# Patient Record
Sex: MALE
[De-identification: names, ages, dates, MRNs, and addresses within clinical notes are randomized per-mention and may not be internally consistent; named-entity substitution may affect disease eponyms.]

---

## 2019-01-01 ENCOUNTER — HOSPITAL (OUTPATIENT)
Dept: OTHER | Age: 39
End: 2019-01-01
Attending: EMERGENCY MEDICINE

## 2024-02-11 ENCOUNTER — HOSPITAL ENCOUNTER (EMERGENCY)
Facility: HOSPITAL | Age: 44
Discharge: HOME | End: 2024-02-11
Attending: EMERGENCY MEDICINE

## 2024-02-11 VITALS
SYSTOLIC BLOOD PRESSURE: 138 MMHG | TEMPERATURE: 98 F | HEIGHT: 70 IN | BODY MASS INDEX: 34.36 KG/M2 | DIASTOLIC BLOOD PRESSURE: 88 MMHG | OXYGEN SATURATION: 97 % | RESPIRATION RATE: 16 BRPM | WEIGHT: 240 LBS | HEART RATE: 70 BPM

## 2024-02-11 DIAGNOSIS — K08.89 PAIN, DENTAL: Primary | ICD-10-CM

## 2024-02-11 PROCEDURE — 99284 EMERGENCY DEPT VISIT MOD MDM: CPT

## 2024-02-11 PROCEDURE — 99283 EMERGENCY DEPT VISIT LOW MDM: CPT

## 2024-02-11 RX ORDER — HYDROCODONE BITARTRATE AND ACETAMINOPHEN 5; 325 MG/1; MG/1
1 TABLET ORAL EVERY 8 HOURS PRN
Qty: 10 TABLET | Refills: 0 | Status: SHIPPED | OUTPATIENT
Start: 2024-02-11 | End: 2024-02-16

## 2024-02-11 RX ORDER — PENICILLIN V POTASSIUM 500 MG/1
500 TABLET ORAL 4 TIMES DAILY
Qty: 40 TABLET | Refills: 0 | Status: SHIPPED | OUTPATIENT
Start: 2024-02-11 | End: 2024-02-21

## 2024-02-11 RX ORDER — PENICILLIN V POTASSIUM 500 MG/1
500 TABLET ORAL ONCE
Status: COMPLETED | OUTPATIENT
Start: 2024-02-11 | End: 2024-02-11

## 2024-02-11 RX ORDER — HYDROCODONE BITARTRATE AND ACETAMINOPHEN 5; 325 MG/1; MG/1
2 TABLET ORAL ONCE
Status: COMPLETED | OUTPATIENT
Start: 2024-02-11 | End: 2024-02-11

## 2024-02-11 RX ORDER — PENICILLIN V POTASSIUM 500 MG/1
500 TABLET ORAL 4 TIMES DAILY
Qty: 28 TABLET | Refills: 0 | Status: SHIPPED | OUTPATIENT
Start: 2024-02-11 | End: 2024-02-11

## 2024-02-11 RX ORDER — HYDROCODONE BITARTRATE AND ACETAMINOPHEN 5; 325 MG/1; MG/1
1 TABLET ORAL EVERY 8 HOURS PRN
Qty: 10 TABLET | Refills: 0 | Status: SHIPPED | OUTPATIENT
Start: 2024-02-11 | End: 2024-02-11

## 2024-02-12 NOTE — ED INITIAL ASSESSMENT (HPI)
Pt broke a molar left upper on Friday by accidentally biting on a bone in a steak. Pt reports throbbing pain.

## 2024-02-12 NOTE — ED PROVIDER NOTES
Patient Seen in: [facility] Emergency Department      History     Chief Complaint   Patient presents with    Dental Problem     Stated Complaint: broken tooth    Subjective:   43-year-old male, denies chronic past medical history, presents with a broken tooth.  States 2 days ago he was eating steak and bit down on the bone and correct his tooth #14.  States it was already decayed and fractured with this completely broke it off.  Has appoint with a dentist tomorrow or Tuesday awaiting hear back in the morning.  No facial swelling or cellulitis.  He is tobacco smoker.  No sore throat.  No difficulty swallowing.  No voice changes.  No fevers.  Not immunocompromise.            Objective:   History reviewed. No pertinent past medical history.           History reviewed. No pertinent surgical history.             Social History     Socioeconomic History    Marital status: Single   Tobacco Use    Smoking status: Some Days     Types: Cigarettes    Smokeless tobacco: Never   Vaping Use    Vaping Use: Never used   Substance and Sexual Activity    Alcohol use: Not Currently    Drug use: Never              Review of Systems   Constitutional:  Negative for fever.   HENT:  Positive for dental problem. Negative for sinus pain, trouble swallowing and voice change.    Respiratory:  Negative for shortness of breath.    Cardiovascular:  Negative for chest pain.   Neurological:  Negative for headaches.       Positive for stated complaint: broken tooth  Other systems are as noted in HPI.  Constitutional and vital signs reviewed.      All other systems reviewed and negative except as noted above.    Physical Exam     ED Triage Vitals [02/11/24 1820]   /88   Pulse 73   Resp 18   Temp 97.6 °F (36.4 °C)   Temp src Temporal   SpO2 97 %   O2 Device None (Room air)       Current:/88   Pulse 73   Temp 97.6 °F (36.4 °C) (Temporal)   Resp 18   Ht 177.8 cm (5' 10\")   Wt 108.9 kg   SpO2 97%   BMI 34.44 kg/m²          Physical Exam  Vitals and nursing note reviewed.   Constitutional:       Appearance: He is not toxic-appearing.   HENT:      Head: Normocephalic.      Nose: Nose normal.      Mouth/Throat:      Pharynx: Oropharynx is clear. No oropharyngeal exudate or posterior oropharyngeal erythema.      Comments: Tooth #14 pain was completely cracked off and missing.  There are some decay of what appears to be the root there.  There is no signs of dental abscess or gum changes.  Posterior oropharynx is clear.  No facial swelling or cellulitis.  Cardiovascular:      Heart sounds: Normal heart sounds.   Pulmonary:      Breath sounds: Normal breath sounds.   Abdominal:      Palpations: Abdomen is soft.   Musculoskeletal:         General: Normal range of motion.   Skin:     General: Skin is warm and dry.   Neurological:      General: No focal deficit present.      Mental Status: He is alert.      Cranial Nerves: No cranial nerve deficit.   Psychiatric:         Behavior: Behavior normal.               ED Course   Labs Reviewed - No data to display                   MDM      Differential diagnosis includes, was not limited to, dental abscess, dental infection, fractured tooth, cellulitis    There are no external records in care everywhere or in Psychiatric that I can view to compare to    43-year-old male presents with dental pain as above.  Seeing dentist either tomorrow or Tuesday.  Educated on Tylenol and Motrin administration.  Will give a short course of Norco for pain.  Also start antibiotics, penicillin VK.  He does not have any antibiotic allergies.  He has a ride home.  He is well-appearing and nontoxic otherwise, counseled smoking cessation, return precaution provided, shared decision made utilized    Patient was screened and evaluated during this visit.  As the treating physician attending to the patient, I determined within reasonable clinical confidence and prior to discharge, that an emergency medical condition was not  or was no longer present.  There was no indication for further evaluation, treatment, or admission on an emergency basis.  Comprehensive verbal and written discharge and follow-up instructions were provided to help prevent relapse or worsening.  Patient was instructed to follow-up with their primary care provider for further evaluation and treatment, return immediately to ER for worsening, concerning, new, or changing/persisting symptoms. I discussed the case with the patient and they had no questions, complaints, or concerns.  Patient was comfortable going home.     Per the discharge paperwork, patients are encouraged to and given instructions on how to sign [facility], where they have access to their records, including any/all incidental findings.     This note was prepared using Dragon Medical voice recognition dictation software. As a result errors may occur. When identified these errors have been corrected. While every attempt is made to correct errors during dictation discrepancies may still exist    Note to patient: The 21st Century Cures Act makes medical notes like these available to patients in the interest of transparency. However, this is a medical document intended as peer to peer communication. It is written in medical language and may contain abbreviations or verbiage that are unfamiliar. It may appear blunt or direct. Medical documents are intended to carry relevant information, facts as evident, and the clinical opinion of the practitioner.                                          Medical Decision Making      Disposition and Plan     Clinical Impression:  1. Pain, dental         Disposition:  Discharge  2/11/2024  6:42 pm    Follow-up:  Conchita King DO  [id number] [address] [id number] [phone]    Follow up  As needed          Medications Prescribed:  Current Discharge Medication List        START taking these medications    Details   penicillin v potassium 500 MG Oral Tab  Take 1 tablet (500 mg total) by mouth 4 (four) times daily for 10 days.  Qty: 40 tablet, Refills: 0      HYDROcodone-acetaminophen 5-325 MG Oral Tab Take 1 tablet by mouth every 8 (eight) hours as needed for Pain.  Qty: 10 tablet, Refills: 0    Associated Diagnoses: Pain, dental